# Patient Record
Sex: FEMALE | Race: WHITE
[De-identification: names, ages, dates, MRNs, and addresses within clinical notes are randomized per-mention and may not be internally consistent; named-entity substitution may affect disease eponyms.]

---

## 2022-06-29 ENCOUNTER — HOSPITAL ENCOUNTER (OUTPATIENT)
Dept: HOSPITAL 95 - ORSCSDS | Age: 82
Discharge: HOME | End: 2022-06-29
Payer: COMMERCIAL

## 2022-06-29 VITALS — BODY MASS INDEX: 35.6 KG/M2 | HEIGHT: 59 IN | WEIGHT: 176.59 LBS

## 2022-06-29 DIAGNOSIS — G56.02: Primary | ICD-10-CM

## 2022-06-29 DIAGNOSIS — E66.9: ICD-10-CM

## 2022-06-29 DIAGNOSIS — Z79.899: ICD-10-CM

## 2022-06-29 DIAGNOSIS — I10: ICD-10-CM

## 2022-06-29 PROCEDURE — 01N54ZZ RELEASE MEDIAN NERVE, PERCUTANEOUS ENDOSCOPIC APPROACH: ICD-10-PCS

## 2022-06-29 NOTE — NUR
06/29/22 0930 Laura Presley
0971 TIME OUT TO VERIFY CORRECT PT, SITE, PROCEEDURE AND ALLERGIES. PT
ELECTED TO PROCEED WITH THE BLOCK OF LT HAND. RELAXING MEDICATION
PROVIDED BY DR. SPARROW. DR. SPARROW AT BEDSIDE. VS STABLE THROUGHTOUT
PROCEEDURE. PT TOLERATED WELL.